# Patient Record
Sex: MALE | Race: WHITE | NOT HISPANIC OR LATINO | Employment: FULL TIME | URBAN - METROPOLITAN AREA
[De-identification: names, ages, dates, MRNs, and addresses within clinical notes are randomized per-mention and may not be internally consistent; named-entity substitution may affect disease eponyms.]

---

## 2021-03-18 ENCOUNTER — HOSPITAL ENCOUNTER (OUTPATIENT)
Dept: URGENT CARE | Facility: CLINIC | Age: 36
Discharge: HOME | End: 2021-03-18
Attending: EMERGENCY MEDICINE

## 2021-03-25 ENCOUNTER — HOSPITAL ENCOUNTER (OUTPATIENT)
Dept: URGENT CARE | Facility: CLINIC | Age: 36
Discharge: HOME | End: 2021-03-25
Attending: EMERGENCY MEDICINE

## 2021-10-09 PROCEDURE — U0004 COV-19 TEST NON-CDC HGH THRU: HCPCS | Performed by: PHYSICIAN ASSISTANT

## 2021-10-11 ENCOUNTER — TELEPHONE (OUTPATIENT)
Dept: URGENT CARE | Facility: CLINIC | Age: 36
End: 2021-10-11

## 2021-10-11 NOTE — TELEPHONE ENCOUNTER
Patient is contacted with positive Covid results.  He reports mild symptoms which is sinus congestion.  Plan he may take symptomatic treatment such as DayQuil/NyQuil.  He is to quarantine for 10 days from the onset of his symptoms.  Return to [facility] for any worsening symptoms such as shortness of breath.  Patient voices understanding.  He is also advised that if any household members are unvaccinated they should quarantine with him for that duration of time.  They should be tested if they develop any symptoms